# Patient Record
Sex: MALE | Race: WHITE | NOT HISPANIC OR LATINO | URBAN - METROPOLITAN AREA
[De-identification: names, ages, dates, MRNs, and addresses within clinical notes are randomized per-mention and may not be internally consistent; named-entity substitution may affect disease eponyms.]

---

## 2022-03-28 ENCOUNTER — EMERGENCY (EMERGENCY)
Facility: HOSPITAL | Age: 67
LOS: 1 days | Discharge: HOME | End: 2022-03-28
Attending: STUDENT IN AN ORGANIZED HEALTH CARE EDUCATION/TRAINING PROGRAM
Payer: MEDICARE

## 2022-03-28 VITALS
DIASTOLIC BLOOD PRESSURE: 132 MMHG | WEIGHT: 195.11 LBS | OXYGEN SATURATION: 97 % | TEMPERATURE: 98 F | HEIGHT: 68 IN | HEART RATE: 126 BPM | SYSTOLIC BLOOD PRESSURE: 251 MMHG | RESPIRATION RATE: 19 BRPM

## 2022-03-28 VITALS — HEART RATE: 92 BPM

## 2022-03-28 PROCEDURE — 93010 ELECTROCARDIOGRAM REPORT: CPT

## 2022-03-28 PROCEDURE — 99284 EMERGENCY DEPT VISIT MOD MDM: CPT

## 2022-03-28 PROCEDURE — 93005 ELECTROCARDIOGRAM TRACING: CPT

## 2022-03-28 PROCEDURE — 99283 EMERGENCY DEPT VISIT LOW MDM: CPT

## 2022-03-28 RX ORDER — AMLODIPINE BESYLATE 2.5 MG/1
1 TABLET ORAL
Qty: 30 | Refills: 0
Start: 2022-03-28 | End: 2022-04-26

## 2022-03-28 RX ORDER — AMLODIPINE BESYLATE 2.5 MG/1
5 TABLET ORAL ONCE
Refills: 0 | Status: COMPLETED | OUTPATIENT
Start: 2022-03-28 | End: 2022-03-28

## 2022-03-28 RX ADMIN — AMLODIPINE BESYLATE 5 MILLIGRAM(S): 2.5 TABLET ORAL at 12:53

## 2022-03-28 NOTE — ED PROVIDER NOTE - NS ED ROS FT

## 2022-03-28 NOTE — ED PROVIDER NOTE - PROGRESS NOTE DETAILS
Still no bleeding. Hypertension improved to 190/90. Pt continues to deny an symptoms. Will start amlodipine and f/u with a PCP. Strict return precautions given.

## 2022-03-28 NOTE — ED PROVIDER NOTE - NSFOLLOWUPINSTRUCTIONS_ED_ALL_ED_FT
Call a primary care doctor to make a follow up appointment within 1-2 weeks.    Take one pill (5 mg) of Amlodipine once per day.   Return to the ER if you develop any new or worsening symptoms such as nose bleeding, chest pain, shortness of breath, numbness, weakness, abdominal pain, nausea, vomiting, or visual changes.

## 2022-03-28 NOTE — ED PROVIDER NOTE - PHYSICAL EXAMINATION
Gen: AAOx3, non-toxic  Head: NCAT  HEENT: no active bleeding from nose. EOMI, oral mucosa moist, normal conjunctiva  Lung: CTAB, no respiratory distress, no wheezes/rhonchi/rales B/L, speaking in full sentences  CV: RRR, no murmurs, rubs or gallops  Abd: soft, NTND, no guarding, no CVA tenderness  MSK: no visible deformities  Neuro: No focal sensory or motor deficits, normal CN exam   Skin: Warm, well perfused, no rash  Psych: normal affect.     Terrence Hoskins, DO

## 2022-03-28 NOTE — ED PROVIDER NOTE - OBJECTIVE STATEMENT
67M with PMH including HTN p/w nose bleed. Reports bleeding from right nostril that started spontaneously approximately 1 hour ago. Now resolved. Denies any other symptoms HA, visual changes, numbness, weakness, chest pain, SOB. Pt noted to be hypertensive. States he does not take medications and has not seen a doctor in several years.

## 2022-03-28 NOTE — ED PROVIDER NOTE - CLINICAL SUMMARY MEDICAL DECISION MAKING FREE TEXT BOX
67M with PMH including HTN p/w nose bleed. Reports bleeding from right nostril that started spontaneously approximately 1 hour ago. Now resolved. Pt well appearing, no active bleeding on exam. Noted to be very hypertensive in triage but pt denies any symptoms and admits to not seeing doctors or taking any medications for his hypertension. Will re-vital and consider basic labs if severe hypertension persists. Likely dc to f/u with PCP to start/continue HTN meds.

## 2022-03-28 NOTE — ED PROVIDER NOTE - PATIENT PORTAL LINK FT
You can access the FollowMyHealth Patient Portal offered by [facility] by registering at the following website: [email or web address]. By joining [email or web address]’s FollowMyHealth portal, you will also be able to view your health information using other applications (apps) compatible with our system.

## 2022-03-28 NOTE — ED ADULT NURSE NOTE - OBJECTIVE STATEMENT
patient presents to ED with c/o nose bleed this morning. at present time no bleeding noted. on arrival patient is hypertensive.  denies headache, no nausea/vomiting, no dizziness.

## 2022-03-29 ENCOUNTER — EMERGENCY (EMERGENCY)
Facility: HOSPITAL | Age: 67
LOS: 1 days | Discharge: HOME | End: 2022-03-29
Attending: EMERGENCY MEDICINE
Payer: MEDICARE

## 2022-03-29 VITALS
SYSTOLIC BLOOD PRESSURE: 195 MMHG | HEART RATE: 104 BPM | OXYGEN SATURATION: 96 % | RESPIRATION RATE: 18 BRPM | TEMPERATURE: 98 F | WEIGHT: 194.01 LBS | HEIGHT: 68 IN | DIASTOLIC BLOOD PRESSURE: 123 MMHG

## 2022-03-29 LAB
ALBUMIN SERPL ELPH-MCNC: 3.7 G/DL — SIGNIFICANT CHANGE UP (ref 3.5–5)
ALP SERPL-CCNC: 74 U/L — SIGNIFICANT CHANGE UP (ref 40–120)
ALT FLD-CCNC: 44 U/L DA — SIGNIFICANT CHANGE UP (ref 10–60)
ANION GAP SERPL CALC-SCNC: 6 MMOL/L — SIGNIFICANT CHANGE UP (ref 5–17)
APTT BLD: 40 SEC — HIGH (ref 27.5–35.5)
AST SERPL-CCNC: 25 U/L — SIGNIFICANT CHANGE UP (ref 10–40)
BASOPHILS # BLD AUTO: 0.04 K/UL — SIGNIFICANT CHANGE UP (ref 0–0.2)
BASOPHILS NFR BLD AUTO: 0.4 % — SIGNIFICANT CHANGE UP (ref 0–2)
BILIRUB SERPL-MCNC: 1.1 MG/DL — SIGNIFICANT CHANGE UP (ref 0.2–1.2)
BUN SERPL-MCNC: 13 MG/DL — SIGNIFICANT CHANGE UP (ref 7–18)
CALCIUM SERPL-MCNC: 8.7 MG/DL — SIGNIFICANT CHANGE UP (ref 8.4–10.5)
CHLORIDE SERPL-SCNC: 107 MMOL/L — SIGNIFICANT CHANGE UP (ref 96–108)
CO2 SERPL-SCNC: 28 MMOL/L — SIGNIFICANT CHANGE UP (ref 22–31)
CREAT SERPL-MCNC: 1 MG/DL — SIGNIFICANT CHANGE UP (ref 0.5–1.3)
EGFR: 82 ML/MIN/1.73M2 — SIGNIFICANT CHANGE UP
EOSINOPHIL # BLD AUTO: 0.12 K/UL — SIGNIFICANT CHANGE UP (ref 0–0.5)
EOSINOPHIL NFR BLD AUTO: 1.1 % — SIGNIFICANT CHANGE UP (ref 0–6)
GLUCOSE SERPL-MCNC: 109 MG/DL — HIGH (ref 70–99)
HCT VFR BLD CALC: 41.3 % — SIGNIFICANT CHANGE UP (ref 39–50)
HGB BLD-MCNC: 14 G/DL — SIGNIFICANT CHANGE UP (ref 13–17)
IMM GRANULOCYTES NFR BLD AUTO: 0.4 % — SIGNIFICANT CHANGE UP (ref 0–1.5)
INR BLD: 1.02 RATIO — SIGNIFICANT CHANGE UP (ref 0.88–1.16)
LYMPHOCYTES # BLD AUTO: 3.28 K/UL — SIGNIFICANT CHANGE UP (ref 1–3.3)
LYMPHOCYTES # BLD AUTO: 31 % — SIGNIFICANT CHANGE UP (ref 13–44)
MCHC RBC-ENTMCNC: 30.7 PG — SIGNIFICANT CHANGE UP (ref 27–34)
MCHC RBC-ENTMCNC: 33.9 GM/DL — SIGNIFICANT CHANGE UP (ref 32–36)
MCV RBC AUTO: 90.6 FL — SIGNIFICANT CHANGE UP (ref 80–100)
MONOCYTES # BLD AUTO: 0.65 K/UL — SIGNIFICANT CHANGE UP (ref 0–0.9)
MONOCYTES NFR BLD AUTO: 6.1 % — SIGNIFICANT CHANGE UP (ref 2–14)
NEUTROPHILS # BLD AUTO: 6.44 K/UL — SIGNIFICANT CHANGE UP (ref 1.8–7.4)
NEUTROPHILS NFR BLD AUTO: 61 % — SIGNIFICANT CHANGE UP (ref 43–77)
NRBC # BLD: 0 /100 WBCS — SIGNIFICANT CHANGE UP (ref 0–0)
PLATELET # BLD AUTO: 243 K/UL — SIGNIFICANT CHANGE UP (ref 150–400)
POTASSIUM SERPL-MCNC: 3.5 MMOL/L — SIGNIFICANT CHANGE UP (ref 3.5–5.3)
POTASSIUM SERPL-SCNC: 3.5 MMOL/L — SIGNIFICANT CHANGE UP (ref 3.5–5.3)
PROT SERPL-MCNC: 7.6 G/DL — SIGNIFICANT CHANGE UP (ref 6–8.3)
PROTHROM AB SERPL-ACNC: 12.1 SEC — SIGNIFICANT CHANGE UP (ref 10.5–13.4)
RBC # BLD: 4.56 M/UL — SIGNIFICANT CHANGE UP (ref 4.2–5.8)
RBC # FLD: 12.8 % — SIGNIFICANT CHANGE UP (ref 10.3–14.5)
SODIUM SERPL-SCNC: 141 MMOL/L — SIGNIFICANT CHANGE UP (ref 135–145)
WBC # BLD: 10.57 K/UL — HIGH (ref 3.8–10.5)
WBC # FLD AUTO: 10.57 K/UL — HIGH (ref 3.8–10.5)

## 2022-03-29 PROCEDURE — 99284 EMERGENCY DEPT VISIT MOD MDM: CPT

## 2022-03-29 RX ORDER — LABETALOL HCL 100 MG
10 TABLET ORAL ONCE
Refills: 0 | Status: COMPLETED | OUTPATIENT
Start: 2022-03-29 | End: 2022-03-29

## 2022-03-29 RX ORDER — PHENYLEPHRINE HCL 0.25 %
2 AEROSOL, SPRAY WITH PUMP (ML) NASAL ONCE
Refills: 0 | Status: COMPLETED | OUTPATIENT
Start: 2022-03-29 | End: 2022-03-29

## 2022-03-29 RX ADMIN — Medication 10 MILLIGRAM(S): at 23:45

## 2022-03-29 RX ADMIN — Medication 2 SPRAY(S): at 23:47

## 2022-03-29 NOTE — ED ADULT TRIAGE NOTE - CHIEF COMPLAINT QUOTE
" I am bleeding again, started this morning, it stopped and tonight at seven I am bleeding heavy for three minutes, I was here yesterday around two o'clock "  Denies blood drips from behind throat.  No active bleeding.

## 2022-03-29 NOTE — ED ADULT NURSE NOTE - CAS ELECT INFOMATION PROVIDED
Discharge instructions and papers were provided by Charge Nurse Trevor REEDER to the patient./DC instructions

## 2022-03-29 NOTE — ED ADULT NURSE NOTE - OBJECTIVE STATEMENT
pt aaox3,with complaint of nosebleed,seen in ed yesterday with the symptom,denies dizziness,denies headache

## 2022-03-30 VITALS
OXYGEN SATURATION: 97 % | RESPIRATION RATE: 16 BRPM | DIASTOLIC BLOOD PRESSURE: 86 MMHG | TEMPERATURE: 98 F | SYSTOLIC BLOOD PRESSURE: 166 MMHG | HEART RATE: 84 BPM

## 2022-03-30 PROCEDURE — 80053 COMPREHEN METABOLIC PANEL: CPT

## 2022-03-30 PROCEDURE — 85730 THROMBOPLASTIN TIME PARTIAL: CPT

## 2022-03-30 PROCEDURE — 86901 BLOOD TYPING SEROLOGIC RH(D): CPT

## 2022-03-30 PROCEDURE — 85610 PROTHROMBIN TIME: CPT

## 2022-03-30 PROCEDURE — 99284 EMERGENCY DEPT VISIT MOD MDM: CPT | Mod: 25

## 2022-03-30 PROCEDURE — 36415 COLL VENOUS BLD VENIPUNCTURE: CPT

## 2022-03-30 PROCEDURE — 93005 ELECTROCARDIOGRAM TRACING: CPT

## 2022-03-30 PROCEDURE — 96375 TX/PRO/DX INJ NEW DRUG ADDON: CPT

## 2022-03-30 PROCEDURE — 96374 THER/PROPH/DIAG INJ IV PUSH: CPT

## 2022-03-30 PROCEDURE — 86850 RBC ANTIBODY SCREEN: CPT

## 2022-03-30 PROCEDURE — 85025 COMPLETE CBC W/AUTO DIFF WBC: CPT

## 2022-03-30 PROCEDURE — 86900 BLOOD TYPING SEROLOGIC ABO: CPT

## 2022-03-30 RX ORDER — HYDRALAZINE HCL 50 MG
10 TABLET ORAL ONCE
Refills: 0 | Status: COMPLETED | OUTPATIENT
Start: 2022-03-30 | End: 2022-03-30

## 2022-03-30 RX ADMIN — Medication 10 MILLIGRAM(S): at 01:02

## 2022-03-30 NOTE — ED PROVIDER NOTE - NSFOLLOWUPCLINICS_GEN_ALL_ED_FT
[facility]  Dermatology  [address] [place] [address]  Phone: [phone]  Fax: [phone]    [facility]  ENT  [address] [place] [address]  Phone: [phone]  Fax: [phone]    [facility]  Internal Medicine  [address] [place] [address]  Phone: [phone]  Fax: [phone]

## 2022-03-30 NOTE — ED PROVIDER NOTE - PATIENT PORTAL LINK FT
You can access the FollowMyHealth Patient Portal offered by [facility] by registering at the following website: [email or web address]. By joining [employer]’s FollowMyHealth portal, you will also be able to view your health information using other applications (apps) compatible with our system.

## 2022-03-30 NOTE — ED PROVIDER NOTE - NOSE FINDINGS
no active epistaxis, no septal hematomas, healing ulceration over dorsum of nose with overlying granulation tissue

## 2022-03-30 NOTE — ED PROVIDER NOTE - THROAT, MLM
[facility]  Circumcision Procedure Note    Date of Admission: 2017  Date of Service:  2017    Patient Name: Andrés Bethea  :  2018  MRN:  [id number]    Informed consent:  We have discussed the proposed procedure (risks, benefits, complications, medications and alternatives) of the circumcision with the parent(s).    Time out performed: yes    Procedure Details:  Informed consent was obtained. Examination of the external anatomical structures was normal. Analgesia was obtained by using 24% Sucrose solution PO and 1% Lidocaine (0.8cc) administered by using a 27 g needle at 10 and 2 o'clock. Penis and surrounding area prepped w/betadine in sterile fashion, fenestrated drape used. Hemostat clamps applied, adhesions released with hemostats.  Mogan  Clamp was applied.  Foreskin removed above clamp with scalpel.  The Mogan clamp was removed and the skin was retracted to the base of the glans.  Any further adhesions were  from the glans. Hemostasis was assured.      Complications:  None. Tolerated without difficulty.        Procedure performed by: MD Berta Penn MD  2017  1:16 PM       uvula midline, no vesicles, no redness, and no oropharyngeal exudate.

## 2022-03-30 NOTE — ED PROVIDER NOTE - NSFOLLOWUPINSTRUCTIONS_ED_ALL_ED_FT
use nasal spray-2 to 3 sprays in each nostril no more than every 4 hours for maximum  of 3 days.  If nosebleed recrs, hold constant pressure over soft part of nose for 15minutes, lean head forward and apply ice pack over nose.  Continue current blood pressure medication.  Followup with [facility] for reevaluation of blood pressure.  Followup with ENT specialist for reevaluation of nosebleed-call office for appointment.  Followup with dermatology specialist for reevaluation of skin lesion over nose.  Return to ED if you develop chest pain, severe headache or uncontrolled nosebleed.      Nosebleed, Adult      A nosebleed is when blood comes out of the nose. Nosebleeds are common and can be caused by many things. They are usually not a sign of a serious medical problem.      Follow these instructions at home:      When you have a nosebleed:      •Sit down.      •Tilt your head a little forward.    •Follow these steps:  1.Pinch your nose with a clean towel or tissue.      2.Keep pinching your nose for 5 minutes. Do not let go.      3.After 5 minutes, let go of your nose.      4.If there is still bleeding, do these steps again. Keep doing these steps until the bleeding stops.        • Do not put tissues or other things in your nose to stop the bleeding.      •Avoid lying down or putting your head back.      •Use a nose spray decongestant as told by your doctor.      After a nosebleed:     •Try not to blow your nose or sniffle for several hours.      •Try not to strain, lift, or bend at the waist for several days.    •Aspirin and blood-thinning medicines make bleeding more likely. If you take these medicines:  •Ask your doctor if you should stop taking them or if you should change how much you take.      •Do not stop taking the medicine unless your doctor tells you to.      •If your nosebleed was caused by dryness, use over-the-counter saline nasal spray or gel and humidifier as told by your doctor. This will keep the inside of your nose moist and allow it to heal. If you need to use one of these products:  •Choose one that is water-soluble.       •Use only as much as you need and use it only as often as needed.       •Do not lie down right away after you use it.      •If you get nosebleeds often, talk with your doctor about treatments. These may include:  •Nasal cautery. A chemical swab or electrical device is used to lightly burn tiny blood vessels inside the nose. This helps stop or prevent nosebleeds.      •Nasal packing. A gauze or other material is placed in the nose to keep constant pressure on the bleeding area.          Contact a doctor if:    •You have a fever.      •You get nosebleeds often.      •You are getting nosebleeds more often than usual.      •You bruise very easily.      •You have something stuck in your nose.      •You have bleeding in your mouth.      •You vomit or cough up brown material.      •You get a nosebleed after you start a new medicine.        Get help right away if:    •You have a nosebleed after you fall or hurt your head.      •Your nosebleed does not go away after 20 minutes.      •You feel dizzy or weak.      •You have unusual bleeding from other parts of your body.      •You have unusual bruising on other parts of your body.      •You get sweaty.      •You vomit blood.        Summary    •Nosebleeds are common. They are usually not a sign of a serious medical problem.      •When you have a nosebleed, sit down and tilt your head a little forward. Pinch your nose with a clean tissue for 5 minutes.      •Use saline spray or saline gel and a humidifier as told by your doctor.      •Get help right away if your nosebleed does not go away after 20 minutes. use nasal spray-2 to 3 sprays in each nostril no more than every 4 hours for maximum  of 3 days.  If nosebleed recurs, hold constant pressure over soft part of nose for 15minutes, lean head forward and apply ice pack over nose.  Continue current blood pressure medication.  Followup with [facility] [place] [facility] for reevaluation of blood pressure.  Followup with ENT specialist for reevaluation of nosebleed-call office for appointment.  Followup with dermatology specialist for reevaluation of skin lesion over nose.  Return to ED if you develop chest pain, severe headache or uncontrolled nosebleed.      Nosebleed, Adult      A nosebleed is when blood comes out of the nose. Nosebleeds are common and can be caused by many things. They are usually not a sign of a serious medical problem.      Follow these instructions at home:      When you have a nosebleed:      •Sit down.      •Tilt your head a little forward.    •Follow these steps:  1.Pinch your nose with a clean towel or tissue.      2.Keep pinching your nose for 5 minutes. Do not let go.      3.After 5 minutes, let go of your nose.      4.If there is still bleeding, do these steps again. Keep doing these steps until the bleeding stops.        • Do not put tissues or other things in your nose to stop the bleeding.      •Avoid lying down or putting your head back.      •Use a nose spray decongestant as told by your doctor.      After a nosebleed:     •Try not to blow your nose or sniffle for several hours.      •Try not to strain, lift, or bend at the waist for several days.    •Aspirin and blood-thinning medicines make bleeding more likely. If you take these medicines:  •Ask your doctor if you should stop taking them or if you should change how much you take.      •Do not stop taking the medicine unless your doctor tells you to.      •If your nosebleed was caused by dryness, use over-the-counter saline nasal spray or gel and humidifier as told by your doctor. This will keep the inside of your nose moist and allow it to heal. If you need to use one of these products:  •Choose one that is water-soluble.       •Use only as much as you need and use it only as often as needed.       •Do not lie down right away after you use it.      •If you get nosebleeds often, talk with your doctor about treatments. These may include:  •Nasal cautery. A chemical swab or electrical device is used to lightly burn tiny blood vessels inside the nose. This helps stop or prevent nosebleeds.      •Nasal packing. A gauze or other material is placed in the nose to keep constant pressure on the bleeding area.          Contact a doctor if:    •You have a fever.      •You get nosebleeds often.      •You are getting nosebleeds more often than usual.      •You bruise very easily.      •You have something stuck in your nose.      •You have bleeding in your mouth.      •You vomit or cough up brown material.      •You get a nosebleed after you start a new medicine.        Get help right away if:    •You have a nosebleed after you fall or hurt your head.      •Your nosebleed does not go away after 20 minutes.      •You feel dizzy or weak.      •You have unusual bleeding from other parts of your body.      •You have unusual bruising on other parts of your body.      •You get sweaty.      •You vomit blood.        Summary    •Nosebleeds are common. They are usually not a sign of a serious medical problem.      •When you have a nosebleed, sit down and tilt your head a little forward. Pinch your nose with a clean tissue for 5 minutes.      •Use saline spray or saline gel and a humidifier as told by your doctor.      •Get help right away if your nosebleed does not go away after 20 minutes.

## 2022-03-30 NOTE — ED PROVIDER NOTE - CLINICAL SUMMARY MEDICAL DECISION MAKING FREE TEXT BOX
66yo M with HTN presents after nosebleed, now resolved. in ED patient hypertensive, will obtain ekg, labs. Given labetalol and neosynephrine nasal spray.  ekg nonischemic, labs show H/H wnl, PLT wnl, Cr wnl  Discussed above with patient. On reeval no recurrence of epistaxis. BP unchanged despite medication, given hydralazine, will reassess. 66yo M with HTN presents after nosebleed, now resolved. in ED patient hypertensive, will obtain ekg, labs. Given labetalol and neosynephrine nasal spray.  ekg nonischemic, labs show H/H wnl, PLT wnl, Cr wnl  Discussed above with patient. On reeval no recurrence of epistaxis. BP unchanged despite medication, given hydralazine, will reassess.  BP improved, patient stable for discharge.

## 2022-04-08 ENCOUNTER — APPOINTMENT (OUTPATIENT)
Dept: OTOLARYNGOLOGY | Facility: CLINIC | Age: 67
End: 2022-04-08

## 2024-04-24 NOTE — ED PROVIDER NOTE - HIV OFFER
Render Post-Care Instructions In Note?: no Detail Level: Zone Render Number Of Lesions Treated: yes Acne Type: Pustules Post-Care Instructions: I reviewed with the patient in detail post-care instructions. Patient is to keep the treatment areas dry overnight, and then apply bacitracin twice daily until healed. Patient may apply hydrogen peroxide soaks to remove any crusting. Prep Text (Optional): Prior to removal the treatment areas were prepped in the usual fashion.  Patient tolerated treatment well. Consent was obtained and risks were reviewed including but not limited to scarring, infection, bleeding, scabbing, incomplete removal, and allergy to anesthesia. Extraction Method: 22 gauge needle Previously Declined (within the last year)